# Patient Record
(demographics unavailable — no encounter records)

---

## 2025-06-04 NOTE — HISTORY OF PRESENT ILLNESS
[FreeTextEntry1] : Ms. MARTINEZ is a 70-year-old  F referred from ED after presenting flank pain for days to weeks. CT identifying a 2 mm left UVJ stone. No history of stones in the past. Denies fevers, chills, LUTS, hematuria, AUR.   ED documents, labs and imaging reviewed.

## 2025-06-04 NOTE — REVIEW OF SYSTEMS
[Fever] : fever [see HPI] : see HPI [Blood in urine that you can see] : blood visible in urine [History of kidney stones] : history of kidney stones [Limb Swelling] : limb swelling [Negative] : Heme/Lymph

## 2025-06-04 NOTE — ASSESSMENT
[FreeTextEntry1] : Ms. MARTINEZ is a 70-year-old  F with 2 mm left UVJ stone.  We discussed medical expulsive therapy, patient amenable.  Follow-up imaging in 2 weeks.  We discussed the strategy of observation with medical expulsive therapy (MET). In general, 70-90% of stones <5mm will pass spontaneously and 20-60% of stones 5-10 mm will pass. In appropriate patients passing small stones, we often offer up to 4 to 6 weeks to attempt to pass stones without surgery.  If observation with MET is unsuccessful during this time period, if they develop any signs of infection, or the patient prefers to intervene sooner, we can proceed to definitive treatment.